# Patient Record
Sex: MALE | Race: WHITE | NOT HISPANIC OR LATINO | Employment: FULL TIME | URBAN - METROPOLITAN AREA
[De-identification: names, ages, dates, MRNs, and addresses within clinical notes are randomized per-mention and may not be internally consistent; named-entity substitution may affect disease eponyms.]

---

## 2021-04-13 DIAGNOSIS — Z23 ENCOUNTER FOR IMMUNIZATION: ICD-10-CM

## 2022-05-14 ENCOUNTER — HOSPITAL ENCOUNTER (EMERGENCY)
Facility: HOSPITAL | Age: 67
Discharge: HOME | End: 2022-05-14
Attending: EMERGENCY MEDICINE
Payer: COMMERCIAL

## 2022-05-14 ENCOUNTER — APPOINTMENT (EMERGENCY)
Dept: RADIOLOGY | Facility: HOSPITAL | Age: 67
End: 2022-05-14
Attending: EMERGENCY MEDICINE
Payer: COMMERCIAL

## 2022-05-14 VITALS
HEART RATE: 59 BPM | RESPIRATION RATE: 16 BRPM | SYSTOLIC BLOOD PRESSURE: 109 MMHG | HEIGHT: 72 IN | DIASTOLIC BLOOD PRESSURE: 62 MMHG | WEIGHT: 207 LBS | OXYGEN SATURATION: 97 % | TEMPERATURE: 97 F | BODY MASS INDEX: 28.04 KG/M2

## 2022-05-14 DIAGNOSIS — F10.920 ALCOHOLIC INTOXICATION WITHOUT COMPLICATION (CMS/HCC): Primary | ICD-10-CM

## 2022-05-14 DIAGNOSIS — G47.30 SLEEP APNEA, UNSPECIFIED TYPE: ICD-10-CM
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
## 2022-05-14 LAB
ALBUMIN SERPL-MCNC: 4 G/DL (ref 3.4–5)
ALP SERPL-CCNC: 53 IU/L (ref 35–126)
ALT SERPL-CCNC: 21 IU/L (ref 16–63)
ANION GAP SERPL CALC-SCNC: 7 MEQ/L (ref 3–15)
AST SERPL-CCNC: 23 IU/L (ref 15–41)
BASOPHILS # BLD: 0.03 K/UL (ref 0.01–0.1)
BASOPHILS NFR BLD: 0.4 %
BILIRUB SERPL-MCNC: 0.5 MG/DL (ref 0.3–1.2)
BUN SERPL-MCNC: 26 MG/DL (ref 8–20)
CALCIUM SERPL-MCNC: 8.3 MG/DL (ref 8.9–10.3)
CHLORIDE SERPL-SCNC: 107 MEQ/L (ref 98–109)
CO2 SERPL-SCNC: 22 MEQ/L (ref 22–32)
CREAT SERPL-MCNC: 1.2 MG/DL (ref 0.8–1.3)
DIFFERENTIAL METHOD BLD: ABNORMAL
EOSINOPHIL # BLD: 0.22 K/UL (ref 0.04–0.54)
EOSINOPHIL NFR BLD: 3 %
ERYTHROCYTE [DISTWIDTH] IN BLOOD BY AUTOMATED COUNT: 12.7 % (ref 11.6–14.4)
ETHANOL SERPL-MCNC: 278 MG/DL
GFR SERPL CREATININE-BSD FRML MDRD: >60 ML/MIN/1.73M*2
GLUCOSE SERPL-MCNC: 127 MG/DL (ref 70–99)
HCT VFR BLDCO AUTO: 38.6 % (ref 40.1–51)
HGB BLD-MCNC: 13.1 G/DL (ref 13.7–17.5)
IMM GRANULOCYTES # BLD AUTO: 0.02 K/UL (ref 0–0.08)
IMM GRANULOCYTES NFR BLD AUTO: 0.3 %
LYMPHOCYTES # BLD: 2.13 K/UL (ref 1.2–3.5)
LYMPHOCYTES NFR BLD: 29.1 %
MCH RBC QN AUTO: 31 PG (ref 28–33.2)
MCHC RBC AUTO-ENTMCNC: 33.9 G/DL (ref 32.2–36.5)
MCV RBC AUTO: 91.5 FL (ref 83–98)
MONOCYTES # BLD: 0.54 K/UL (ref 0.3–1)
MONOCYTES NFR BLD: 7.4 %
NEUTROPHILS # BLD: 4.37 K/UL (ref 1.7–7)
NEUTS SEG NFR BLD: 59.8 %
NRBC BLD-RTO: 0 %
PDW BLD AUTO: 10.3 FL (ref 9.4–12.4)
PLATELET # BLD AUTO: 216 K/UL (ref 150–350)
POTASSIUM SERPL-SCNC: 3.6 MEQ/L (ref 3.6–5.1)
PROT SERPL-MCNC: 6.6 G/DL (ref 6–8.2)
RBC # BLD AUTO: 4.22 M/UL (ref 4.5–5.8)
SODIUM SERPL-SCNC: 136 MEQ/L (ref 136–144)
WBC # BLD AUTO: 7.31 K/UL (ref 3.8–10.5)

## 2022-05-14 PROCEDURE — 85025 COMPLETE CBC W/AUTO DIFF WBC: CPT | Performed by: EMERGENCY MEDICINE

## 2022-05-14 PROCEDURE — 96360 HYDRATION IV INFUSION INIT: CPT

## 2022-05-14 PROCEDURE — 3E0337Z INTRODUCTION OF ELECTROLYTIC AND WATER BALANCE SUBSTANCE INTO PERIPHERAL VEIN, PERCUTANEOUS APPROACH: ICD-10-PCS | Performed by: EMERGENCY MEDICINE

## 2022-05-14 PROCEDURE — 63600000 HC DRUGS/DETAIL CODE: Performed by: EMERGENCY MEDICINE

## 2022-05-14 PROCEDURE — 80053 COMPREHEN METABOLIC PANEL: CPT | Performed by: EMERGENCY MEDICINE

## 2022-05-14 PROCEDURE — 70450 CT HEAD/BRAIN W/O DYE: CPT | Mod: MG

## 2022-05-14 PROCEDURE — 36415 COLL VENOUS BLD VENIPUNCTURE: CPT | Performed by: EMERGENCY MEDICINE

## 2022-05-14 PROCEDURE — 96361 HYDRATE IV INFUSION ADD-ON: CPT

## 2022-05-14 PROCEDURE — G0480 DRUG TEST DEF 1-7 CLASSES: HCPCS | Performed by: EMERGENCY MEDICINE

## 2022-05-14 PROCEDURE — 25800000 HC PHARMACY IV SOLUTIONS: Performed by: EMERGENCY MEDICINE

## 2022-05-14 PROCEDURE — G1004 CDSM NDSC: HCPCS

## 2022-05-14 PROCEDURE — 99284 EMERGENCY DEPT VISIT MOD MDM: CPT | Mod: 25

## 2022-05-14 RX ORDER — ROSUVASTATIN CALCIUM 5 MG/1
TABLET, COATED ORAL
COMMUNITY
Start: 2022-05-09

## 2022-05-14 RX ORDER — ONDANSETRON HYDROCHLORIDE 2 MG/ML
4 INJECTION, SOLUTION INTRAVENOUS ONCE
Status: COMPLETED | OUTPATIENT
Start: 2022-05-14 | End: 2022-05-14

## 2022-05-14 RX ORDER — FLUTICASONE PROPIONATE 50 MCG
2 SPRAY, SUSPENSION (ML) NASAL DAILY
COMMUNITY
Start: 2022-03-15

## 2022-05-14 RX ADMIN — SODIUM CHLORIDE 1000 ML: 9 INJECTION, SOLUTION INTRAVENOUS at 01:57

## 2022-05-14 RX ADMIN — ONDANSETRON HYDROCHLORIDE 4 MG: 2 SOLUTION INTRAMUSCULAR; INTRAVENOUS at 02:07

## 2022-05-14 ASSESSMENT — ENCOUNTER SYMPTOMS
FEVER: 0
EYE DISCHARGE: 0
NUMBNESS: 0
WEAKNESS: 0
SHORTNESS OF BREATH: 0
COUGH: 0
NERVOUS/ANXIOUS: 0
SORE THROAT: 0
NAUSEA: 0
CHILLS: 0
VOMITING: 0
EYE PAIN: 0
HEMATURIA: 0
ABDOMINAL PAIN: 0
ARTHRALGIAS: 0
DYSURIA: 0
BACK PAIN: 0

## 2022-05-14 ASSESSMENT — LIFESTYLE VARIABLES: INTOXICATION: 1

## 2022-05-14 NOTE — ED PROVIDER NOTES
This patient presents to the emergency department after coming home from drinking and falling on the ground he did not hit his head best I can tell he admits to drinking alcohol did have vomiting there is no pain anywhere no shortness of breath      History provided by:  Spouse  History limited by: Alcohol intoxication.  Trauma  Mechanism of injury: None and fall  Injury location: None.  Incident location: Home.  Time since incident: Just prior to arrival.     Fall:       Fall occurred: found on the floor.       Impact surface: Ground.       Point of impact: Unknown.    Current symptoms:       Associated symptoms:             Denies abdominal pain, back pain, chest pain, nausea and vomiting.   Alcohol Intoxication  Associated symptoms: no abdominal pain, no chest pain, no cough, no ear pain, no fever, no nausea, no rash, no shortness of breath, no sore throat and no vomiting        Chief Complaint   Patient presents with   • Fall   • Alcohol Intoxication        HPI         Past Medical History:   Diagnosis Date   • Lipid disorder          History reviewed. No pertinent surgical history.    History reviewed. No pertinent family history.    Social History     Tobacco Use   • Smoking status: Never Smoker   • Smokeless tobacco: Never Used   Substance Use Topics   • Alcohol use: Yes   • Drug use: Never       Systems Reviewed from Nursing Triage:                 Review of Systems   Constitutional: Negative for chills and fever.   HENT: Negative for ear pain and sore throat.    Eyes: Negative for pain and discharge.   Respiratory: Negative for cough and shortness of breath.    Cardiovascular: Negative for chest pain and leg swelling.   Gastrointestinal: Negative for abdominal pain, nausea and vomiting.   Genitourinary: Negative for dysuria and hematuria.   Musculoskeletal: Negative for arthralgias and back pain.   Skin: Negative for rash.   Neurological: Negative for weakness and numbness.   Psychiatric/Behavioral: The  patient is not nervous/anxious.    All other systems reviewed and are negative.           ED Triage Vitals [05/14/22 0125]   Temp Heart Rate Resp BP SpO2   36.1 °C (97 °F) (!) 59 16 120/66 97 %      Temp Source Heart Rate Source Patient Position BP Location FiO2 (%) (Set)   Temporal -- Lying Right upper arm --       Vitals:    05/14/22 0125 05/14/22 0237   BP: 120/66 109/62   BP Location: Right upper arm    Patient Position: Lying    Pulse: (!) 59    Resp: 16    Temp: 36.1 °C (97 °F)    TempSrc: Temporal    SpO2: 97% 97%   Weight: 93.9 kg (207 lb)    Height: 1.829 m (6')                          Physical Exam  Vitals and nursing note reviewed.   Constitutional:       Appearance: He is well-developed.      Comments: Patient did seems to be under the influence of alcohol   HENT:      Head: Normocephalic and atraumatic.      Comments: ~Examination of the scalp failed to show any evidence of redness swelling ecchymosis deformity there was no external evidence of head trauma injury the patient was not complaining of any headache  Eyes:      Conjunctiva/sclera: Conjunctivae normal.   Cardiovascular:      Rate and Rhythm: Normal rate and regular rhythm.      Heart sounds: No murmur heard.  Pulmonary:      Effort: Pulmonary effort is normal. No respiratory distress.      Breath sounds: Normal breath sounds.   Abdominal:      Palpations: Abdomen is soft.      Tenderness: There is no abdominal tenderness.   Musculoskeletal:      Cervical back: Neck supple.   Skin:     General: Skin is warm and dry.   Neurological:      General: No focal deficit present.      Mental Status: He is alert and oriented to person, place, and time.   Psychiatric:      Comments: Appears to be under the influence of alcohol              CT HEAD WITHOUT IV CONTRAST    (Results Pending)       Labs Reviewed   CBC AND DIFF - Abnormal       Result Value    WBC 7.31      RBC 4.22 (*)     Hemoglobin 13.1 (*)     Hematocrit 38.6 (*)     MCV 91.5      MCH 31.0       MCHC 33.9      RDW 12.7      Platelets 216      MPV 10.3      Differential Type Auto      nRBC 0.0      Immature Granulocytes 0.3      Neutrophils 59.8      Lymphocytes 29.1      Monocytes 7.4      Eosinophils 3.0      Basophils 0.4      Immature Granulocytes, Absolute 0.02      Neutrophils, Absolute 4.37      Lymphocytes, Absolute 2.13      Monocytes, Absolute 0.54      Eosinophils, Absolute 0.22      Basophils, Absolute 0.03     COMPREHENSIVE METABOLIC PANEL - Abnormal    Sodium 136      Potassium 3.6      Chloride 107      CO2 22      BUN 26 (*)     Creatinine 1.2      Glucose 127 (*)     Calcium 8.3 (*)     AST (SGOT) 23      ALT (SGPT) 21      Alkaline Phosphatase 53      Total Protein 6.6      Albumin 4.0      Bilirubin, Total 0.5      eGFR >60.0      Anion Gap 7     ETHANOL - Abnormal    Ethanol 278 (*)        CT HEAD WITHOUT IV CONTRAST    (Results Pending)         Procedures    Final diagnoses:   [F10.920] Alcoholic intoxication without complication (CMS/HCC)   [W19.XXXA] Fall, initial encounter   [G47.30] Sleep apnea, unspecified type       ED Course & MDM     Clinical Impressions as of 05/14/22 0417   Alcoholic intoxication without complication (CMS/HCC)   Fall, initial encounter   Sleep apnea, unspecified type           MDM  Number of Diagnoses or Management Options  Alcoholic intoxication without complication (CMS/HCC): new and requires workup  Fall, initial encounter: new and does not require workup  Sleep apnea, unspecified type: new and requires workup  Diagnosis management comments: The patient had several episodes of snoring and sleep apnea while awaiting his CAT scan       Amount and/or Complexity of Data Reviewed  Clinical lab tests: reviewed and ordered  Obtain history from someone other than the patient: yes    Risk of Complications, Morbidity, and/or Mortality  Presenting problems: moderate  Diagnostic procedures: moderate  Management options: moderate        4:17 AM    Impression:  Alcohol intoxication    Plan: Discharge home    Vital Signs Review: Vital signs have been reviewed. The oxygen saturation is  SpO2: 97 % which is normal.      Romulo Cody,   5/14/2022          This document was created using dragon dictation software.  There might be some typographical errors due to this technology.     Romulo Cody,   05/14/22 0417